# Patient Record
Sex: FEMALE | Race: BLACK OR AFRICAN AMERICAN | HISPANIC OR LATINO
[De-identification: names, ages, dates, MRNs, and addresses within clinical notes are randomized per-mention and may not be internally consistent; named-entity substitution may affect disease eponyms.]

---

## 2017-12-29 ENCOUNTER — RX RENEWAL (OUTPATIENT)
Age: 42
End: 2017-12-29

## 2018-01-22 ENCOUNTER — APPOINTMENT (OUTPATIENT)
Dept: HEART AND VASCULAR | Facility: CLINIC | Age: 43
End: 2018-01-22
Payer: COMMERCIAL

## 2018-01-22 VITALS
WEIGHT: 134 LBS | HEART RATE: 98 BPM | HEIGHT: 62 IN | DIASTOLIC BLOOD PRESSURE: 126 MMHG | SYSTOLIC BLOOD PRESSURE: 185 MMHG | BODY MASS INDEX: 24.66 KG/M2

## 2018-01-22 PROCEDURE — 99213 OFFICE O/P EST LOW 20 MIN: CPT

## 2018-01-22 RX ORDER — HYDROCHLOROTHIAZIDE 12.5 MG/1
12.5 CAPSULE ORAL DAILY
Qty: 90 | Refills: 3 | Status: ACTIVE | OUTPATIENT

## 2018-01-24 ENCOUNTER — EMERGENCY (EMERGENCY)
Facility: HOSPITAL | Age: 43
LOS: 1 days | Discharge: HOME | End: 2018-01-24
Attending: EMERGENCY MEDICINE | Admitting: EMERGENCY MEDICINE
Payer: COMMERCIAL

## 2018-01-24 VITALS
DIASTOLIC BLOOD PRESSURE: 90 MMHG | SYSTOLIC BLOOD PRESSURE: 158 MMHG | OXYGEN SATURATION: 98 % | TEMPERATURE: 99 F | HEART RATE: 110 BPM | RESPIRATION RATE: 18 BRPM

## 2018-01-24 VITALS
TEMPERATURE: 100 F | OXYGEN SATURATION: 95 % | DIASTOLIC BLOOD PRESSURE: 125 MMHG | SYSTOLIC BLOOD PRESSURE: 183 MMHG | HEIGHT: 62 IN | RESPIRATION RATE: 22 BRPM | WEIGHT: 134.04 LBS | HEART RATE: 126 BPM

## 2018-01-24 DIAGNOSIS — J02.9 ACUTE PHARYNGITIS, UNSPECIFIED: ICD-10-CM

## 2018-01-24 DIAGNOSIS — R09.89 OTHER SPECIFIED SYMPTOMS AND SIGNS INVOLVING THE CIRCULATORY AND RESPIRATORY SYSTEMS: ICD-10-CM

## 2018-01-24 DIAGNOSIS — Z98.891 HISTORY OF UTERINE SCAR FROM PREVIOUS SURGERY: Chronic | ICD-10-CM

## 2018-01-24 DIAGNOSIS — Z79.899 OTHER LONG TERM (CURRENT) DRUG THERAPY: ICD-10-CM

## 2018-01-24 DIAGNOSIS — R50.9 FEVER, UNSPECIFIED: ICD-10-CM

## 2018-01-24 DIAGNOSIS — R00.0 TACHYCARDIA, UNSPECIFIED: ICD-10-CM

## 2018-01-24 DIAGNOSIS — Z79.82 LONG TERM (CURRENT) USE OF ASPIRIN: ICD-10-CM

## 2018-01-24 DIAGNOSIS — R09.81 NASAL CONGESTION: ICD-10-CM

## 2018-01-24 DIAGNOSIS — R05 COUGH: ICD-10-CM

## 2018-01-24 LAB
APTT BLD: 29 SEC — SIGNIFICANT CHANGE UP (ref 27.5–37.4)
BASOPHILS NFR BLD AUTO: 0.1 % — SIGNIFICANT CHANGE UP (ref 0–2)
CK MB CFR SERPL CALC: 1 NG/ML — SIGNIFICANT CHANGE UP (ref 0–6.7)
CK SERPL-CCNC: 92 U/L — SIGNIFICANT CHANGE UP (ref 25–170)
EOSINOPHIL NFR BLD AUTO: 1.5 % — SIGNIFICANT CHANGE UP (ref 0–6)
HCT VFR BLD CALC: 40.1 % — SIGNIFICANT CHANGE UP (ref 34.5–45)
HGB BLD-MCNC: 13.5 G/DL — SIGNIFICANT CHANGE UP (ref 11.5–15.5)
INR BLD: 1.07 — SIGNIFICANT CHANGE UP (ref 0.88–1.16)
LACTATE SERPL-SCNC: 1 MMOL/L — SIGNIFICANT CHANGE UP (ref 0.5–2)
LYMPHOCYTES # BLD AUTO: 9.4 % — LOW (ref 13–44)
MCHC RBC-ENTMCNC: 26 PG — LOW (ref 27–34)
MCHC RBC-ENTMCNC: 33.7 G/DL — SIGNIFICANT CHANGE UP (ref 32–36)
MCV RBC AUTO: 77.3 FL — LOW (ref 80–100)
MONOCYTES NFR BLD AUTO: 8 % — SIGNIFICANT CHANGE UP (ref 2–14)
NEUTROPHILS NFR BLD AUTO: 81 % — HIGH (ref 43–77)
NT-PROBNP SERPL-SCNC: 370 PG/ML — HIGH (ref 0–300)
PLATELET # BLD AUTO: 191 K/UL — SIGNIFICANT CHANGE UP (ref 150–400)
PROTHROM AB SERPL-ACNC: 11.9 SEC — SIGNIFICANT CHANGE UP (ref 9.8–12.7)
RBC # BLD: 5.19 M/UL — SIGNIFICANT CHANGE UP (ref 3.8–5.2)
RBC # FLD: 14.4 % — SIGNIFICANT CHANGE UP (ref 10.3–16.9)
S PYO AG SPEC QL IA: NEGATIVE — SIGNIFICANT CHANGE UP
TROPONIN T SERPL-MCNC: <0.01 NG/ML — SIGNIFICANT CHANGE UP (ref 0–0.01)
WBC # BLD: 7.5 K/UL — SIGNIFICANT CHANGE UP (ref 3.8–10.5)
WBC # FLD AUTO: 7.5 K/UL — SIGNIFICANT CHANGE UP (ref 3.8–10.5)

## 2018-01-24 PROCEDURE — 71046 X-RAY EXAM CHEST 2 VIEWS: CPT | Mod: 26

## 2018-01-24 PROCEDURE — 83880 ASSAY OF NATRIURETIC PEPTIDE: CPT

## 2018-01-24 PROCEDURE — 87880 STREP A ASSAY W/OPTIC: CPT

## 2018-01-24 PROCEDURE — 99285 EMERGENCY DEPT VISIT HI MDM: CPT | Mod: 25

## 2018-01-24 PROCEDURE — 82553 CREATINE MB FRACTION: CPT

## 2018-01-24 PROCEDURE — 80053 COMPREHEN METABOLIC PANEL: CPT

## 2018-01-24 PROCEDURE — 87081 CULTURE SCREEN ONLY: CPT

## 2018-01-24 PROCEDURE — 85025 COMPLETE CBC W/AUTO DIFF WBC: CPT

## 2018-01-24 PROCEDURE — 96374 THER/PROPH/DIAG INJ IV PUSH: CPT

## 2018-01-24 PROCEDURE — 87040 BLOOD CULTURE FOR BACTERIA: CPT

## 2018-01-24 PROCEDURE — 83605 ASSAY OF LACTIC ACID: CPT

## 2018-01-24 PROCEDURE — 99284 EMERGENCY DEPT VISIT MOD MDM: CPT | Mod: 25

## 2018-01-24 PROCEDURE — 93010 ELECTROCARDIOGRAM REPORT: CPT

## 2018-01-24 PROCEDURE — 85730 THROMBOPLASTIN TIME PARTIAL: CPT

## 2018-01-24 PROCEDURE — 93005 ELECTROCARDIOGRAM TRACING: CPT

## 2018-01-24 PROCEDURE — 71046 X-RAY EXAM CHEST 2 VIEWS: CPT

## 2018-01-24 PROCEDURE — 84484 ASSAY OF TROPONIN QUANT: CPT

## 2018-01-24 PROCEDURE — 36415 COLL VENOUS BLD VENIPUNCTURE: CPT

## 2018-01-24 PROCEDURE — 82550 ASSAY OF CK (CPK): CPT

## 2018-01-24 PROCEDURE — 85610 PROTHROMBIN TIME: CPT

## 2018-01-24 RX ORDER — ACETAMINOPHEN 500 MG
650 TABLET ORAL ONCE
Qty: 0 | Refills: 0 | Status: COMPLETED | OUTPATIENT
Start: 2018-01-24 | End: 2018-01-24

## 2018-01-24 RX ORDER — METOPROLOL TARTRATE 50 MG
1 TABLET ORAL
Qty: 7 | Refills: 0 | OUTPATIENT
Start: 2018-01-24 | End: 2018-01-30

## 2018-01-24 RX ORDER — SODIUM CHLORIDE 9 MG/ML
1000 INJECTION INTRAMUSCULAR; INTRAVENOUS; SUBCUTANEOUS ONCE
Qty: 0 | Refills: 0 | Status: COMPLETED | OUTPATIENT
Start: 2018-01-24 | End: 2018-01-24

## 2018-01-24 RX ORDER — SODIUM CHLORIDE 9 MG/ML
1000 INJECTION INTRAMUSCULAR; INTRAVENOUS; SUBCUTANEOUS ONCE
Qty: 0 | Refills: 0 | Status: DISCONTINUED | OUTPATIENT
Start: 2018-01-24 | End: 2018-01-24

## 2018-01-24 RX ORDER — KETOROLAC TROMETHAMINE 30 MG/ML
30 SYRINGE (ML) INJECTION ONCE
Qty: 0 | Refills: 0 | Status: DISCONTINUED | OUTPATIENT
Start: 2018-01-24 | End: 2018-01-24

## 2018-01-24 RX ADMIN — Medication 30 MILLIGRAM(S): at 21:35

## 2018-01-24 RX ADMIN — Medication 650 MILLIGRAM(S): at 19:47

## 2018-01-24 RX ADMIN — Medication 650 MILLIGRAM(S): at 20:24

## 2018-01-24 RX ADMIN — SODIUM CHLORIDE 1000 MILLILITER(S): 9 INJECTION INTRAMUSCULAR; INTRAVENOUS; SUBCUTANEOUS at 17:48

## 2018-01-24 RX ADMIN — SODIUM CHLORIDE 1000 MILLILITER(S): 9 INJECTION INTRAMUSCULAR; INTRAVENOUS; SUBCUTANEOUS at 20:24

## 2018-01-24 RX ADMIN — SODIUM CHLORIDE 1000 MILLILITER(S): 9 INJECTION INTRAMUSCULAR; INTRAVENOUS; SUBCUTANEOUS at 21:35

## 2018-01-24 NOTE — ED PROVIDER NOTE - MEDICAL DECISION MAKING DETAILS
41 y/o female with + flu on tamiflu x1 dose.  with /121 with 99.7 temp. No chest pain or sob. Hx of HTN. will eval CE, CXR, rapid strep. No pe risk factors. 43 y/o female with + flu on tamiflu x1 dose.  with /121 with 99.7 temp. No chest pain or sob. Hx of HTN. will eval CE, CXR, rapid strep. No pe risk factors.     CE negative. CXR negative. Strep negative. VS improved. no chest pain or sob. Will continue to monitor. 43 y/o female with + flu on tamiflu x1 dose.  with /121 with 99.7 temp. No chest pain or sob. Hx of HTN. will eval CE, CXR, rapid strep. No pe risk factors.   CE negative. CXR negative. Strep negative. VS improved. no chest pain or sob. Will continue to monitor. Spoke to Dr. Erwin, will add on lopressor 50mg daily and pt will see him on Monday the 29th. Pt agrees to plan. Is non-toxic appearing and is safe for dc. 41 y/o female with + flu on tamiflu x1 dose.  with /121 with 99.7 temp. No chest pain or sob. Hx of HTN. will eval CE, CXR, rapid strep. No pe risk factors.   CE negative. CXR negative. Strep negative. VS improved. no chest pain or sob. Will continue to monitor. Spoke to Dr. Erwin, will add on lopressor 50mg daily and pt will see him on Monday the 29th. Pt agrees to plan. Is non-toxic appearing and is safe for dc. Pt has been masked throughout stay in ED. Advised continue droplet precautions. Pt on tamiflu - rx from [facility]

## 2018-01-24 NOTE — ED ADULT TRIAGE NOTE - ARRIVAL INFO ADDITIONAL COMMENTS
Patient reports having a productive  cough with brown sputum, fever, chills, runny nose since yesterday. Patient has hx of HTN,has been compliant. Patient denies any HA, visual changes, numbness or tingling to extremities, CP, SOB.

## 2018-01-24 NOTE — ED PROVIDER NOTE - ATTENDING CONTRIBUTION TO CARE
41 yo with PMH of HTN, one day onset of runny nose , cough , congestion, + flu at [facility] today , HTN and tachy sent to ER, clear lungs, non toxic appearing, tachy on arrival, EKG w sinus tachy, Pt given tamiflu at [facility]. low grade temp. 43 yo with PMH of HTN, one day onset of runny nose , cough , congestion, + flu at [facility] today , HTN and tachy sent to ER, clear lungs, non toxic appearing, tachy on arrival, EKG w sinus tachy, Pt given tamiflu at [facility]. low grade temp.,   As CMP with elevated anion gap and still tachy after 2 L will give toradol and administer additional L of NS and reeval, pt currently appears completely non toxic despite persistent tachy and pt reporting feeling improved,

## 2018-01-24 NOTE — ED ADULT NURSE NOTE - CHIEF COMPLAINT QUOTE
Patient states, "I was just at [facility] and they diagnosed me with the flu. They sent me here because my heart rate was high."

## 2018-01-24 NOTE — ED ADULT NURSE NOTE - OBJECTIVE STATEMENT
Pt came to ED from Urgent care for Flu and tachycardia.  Pt currently tachycardic with hypertension. Pt reports hx of hypertension. PT is alert and oriented x3, positive PMS x4 extremities. Pt also complaining for throat irritation.  Pt denies D/N/V, chest pain, SOB, abd pain, /GI symptoms.  Throat is reddened, Mallampati score of 1.

## 2018-01-24 NOTE — ED ADULT NURSE NOTE - CHPI ED SYMPTOMS NEG
no fever/no chills/no back pain/no diaphoresis/no chest pain/no dizziness/no vomiting/no shortness of breath/no syncope/no nausea

## 2018-01-24 NOTE — ED ADULT TRIAGE NOTE - CHIEF COMPLAINT QUOTE
Patient states, "I was just at [facility] and they diagnosed me with the flu. They sent me here because my heart rate was high."

## 2018-01-29 ENCOUNTER — APPOINTMENT (OUTPATIENT)
Dept: HEART AND VASCULAR | Facility: CLINIC | Age: 43
End: 2018-01-29
Payer: COMMERCIAL

## 2018-01-29 VITALS — DIASTOLIC BLOOD PRESSURE: 92 MMHG | SYSTOLIC BLOOD PRESSURE: 148 MMHG | HEART RATE: 75 BPM

## 2018-01-29 DIAGNOSIS — I10 ESSENTIAL (PRIMARY) HYPERTENSION: ICD-10-CM

## 2018-01-29 LAB
CULTURE RESULTS: SIGNIFICANT CHANGE UP
CULTURE RESULTS: SIGNIFICANT CHANGE UP
SPECIMEN SOURCE: SIGNIFICANT CHANGE UP
SPECIMEN SOURCE: SIGNIFICANT CHANGE UP

## 2018-01-29 PROCEDURE — 99213 OFFICE O/P EST LOW 20 MIN: CPT

## 2018-01-29 RX ORDER — METOPROLOL SUCCINATE 50 MG/1
50 TABLET, EXTENDED RELEASE ORAL DAILY
Qty: 1 | Refills: 3 | Status: ACTIVE | OUTPATIENT
Start: 2018-01-29

## 2018-03-09 NOTE — ED PROVIDER NOTE - PROGRESS NOTE DETAILS
no VS improved. Stable with no chest pain, sob HR now 110 , pt still non toxic appearing, will d/c , strict return instructions, pt agrees

## 2019-10-02 PROBLEM — E78.00 PURE HYPERCHOLESTEROLEMIA, UNSPECIFIED: Chronic | Status: ACTIVE | Noted: 2018-01-24

## 2019-11-04 ENCOUNTER — APPOINTMENT (OUTPATIENT)
Dept: HEART AND VASCULAR | Facility: CLINIC | Age: 44
End: 2019-11-04

## 2020-07-05 NOTE — ED ADULT NURSE NOTE - MODE OF DISCHARGE
Pt walked into ED c.o alcohol withdrawal. 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 I have been drinking for the past 7 days heavily" Pt able to do basic math, denies hallucinations, + nausea, + HA 5/10, - tremors to touch at this time in bl hands. Pt states last drink was 4 hours ago. Pt denies CP, palpitations at this time. Ambulatory

## 2022-02-07 NOTE — ED ADULT NURSE NOTE - CHPI ED SYMPTOMS POS
Freeman Bermudez, back on November 23rd I see Mckenzie's note about her sending you  a message about this form/letter  I know it was a long time ago, but do you remember doing anything with it? I don't see anything in the patient's chart about it anywhere   Thanks COUGH

## 2025-05-13 NOTE — ED ADULT NURSE NOTE - CAS ELECT INFOMATION PROVIDED
[TextEntry] : FH neg for ca of breast, ovary, uterus & colon Neither parent had a hip fracture
[TextEntry] : FH neg for ca of breast, ovary, uterus & colon Neither parent had a hip fracture
DC instructions